# Patient Record
Sex: FEMALE | Race: WHITE | NOT HISPANIC OR LATINO
[De-identification: names, ages, dates, MRNs, and addresses within clinical notes are randomized per-mention and may not be internally consistent; named-entity substitution may affect disease eponyms.]

---

## 2017-06-06 ENCOUNTER — APPOINTMENT (OUTPATIENT)
Dept: CT IMAGING | Facility: IMAGING CENTER | Age: 82
End: 2017-06-06

## 2017-06-06 ENCOUNTER — OUTPATIENT (OUTPATIENT)
Dept: OUTPATIENT SERVICES | Facility: HOSPITAL | Age: 82
LOS: 1 days | End: 2017-06-06
Payer: COMMERCIAL

## 2017-06-06 DIAGNOSIS — M48.06 SPINAL STENOSIS, LUMBAR REGION: Chronic | ICD-10-CM

## 2017-06-06 DIAGNOSIS — Z86.718 PERSONAL HISTORY OF OTHER VENOUS THROMBOSIS AND EMBOLISM: ICD-10-CM

## 2017-06-06 DIAGNOSIS — R10.30 LOWER ABDOMINAL PAIN, UNSPECIFIED: ICD-10-CM

## 2017-06-06 PROCEDURE — 74177 CT ABD & PELVIS W/CONTRAST: CPT

## 2017-06-06 PROCEDURE — 71260 CT THORAX DX C+: CPT

## 2018-03-09 ENCOUNTER — RESULT REVIEW (OUTPATIENT)
Age: 83
End: 2018-03-09

## 2018-04-19 ENCOUNTER — LABORATORY RESULT (OUTPATIENT)
Age: 83
End: 2018-04-19

## 2018-04-19 ENCOUNTER — APPOINTMENT (OUTPATIENT)
Dept: UROLOGY | Facility: CLINIC | Age: 83
End: 2018-04-19
Payer: MEDICARE

## 2018-04-19 VITALS — SYSTOLIC BLOOD PRESSURE: 152 MMHG | RESPIRATION RATE: 16 BRPM | HEART RATE: 78 BPM | DIASTOLIC BLOOD PRESSURE: 72 MMHG

## 2018-04-19 DIAGNOSIS — N28.1 CYST OF KIDNEY, ACQUIRED: ICD-10-CM

## 2018-04-19 DIAGNOSIS — R31.29 OTHER MICROSCOPIC HEMATURIA: ICD-10-CM

## 2018-04-19 PROCEDURE — 99203 OFFICE O/P NEW LOW 30 MIN: CPT | Mod: 25

## 2018-04-19 PROCEDURE — 51701 INSERT BLADDER CATHETER: CPT

## 2018-04-23 LAB
APPEARANCE: CLEAR
BACTERIA UR CULT: NORMAL
BACTERIA: NEGATIVE
BILIRUBIN URINE: NEGATIVE
BLOOD URINE: NEGATIVE
COLOR: YELLOW
GLUCOSE QUALITATIVE U: 100 MG/DL
KETONES URINE: NEGATIVE
LEUKOCYTE ESTERASE URINE: NEGATIVE
MICROSCOPIC-UA: NORMAL
NITRITE URINE: NEGATIVE
PH URINE: 5.5
PROTEIN URINE: NEGATIVE MG/DL
RED BLOOD CELLS URINE: 2 /HPF
SPECIFIC GRAVITY URINE: 1.03
SQUAMOUS EPITHELIAL CELLS: 1 /HPF
UROBILINOGEN URINE: NEGATIVE MG/DL
WHITE BLOOD CELLS URINE: 1 /HPF

## 2018-04-24 ENCOUNTER — RESULT REVIEW (OUTPATIENT)
Age: 83
End: 2018-04-24

## 2018-06-25 ENCOUNTER — APPOINTMENT (OUTPATIENT)
Dept: ORTHOPEDIC SURGERY | Facility: CLINIC | Age: 83
End: 2018-06-25
Payer: MEDICARE

## 2018-06-25 VITALS
HEIGHT: 58 IN | WEIGHT: 170 LBS | SYSTOLIC BLOOD PRESSURE: 166 MMHG | HEART RATE: 80 BPM | BODY MASS INDEX: 35.68 KG/M2 | DIASTOLIC BLOOD PRESSURE: 70 MMHG

## 2018-06-25 PROCEDURE — 99204 OFFICE O/P NEW MOD 45 MIN: CPT | Mod: 25

## 2018-06-25 PROCEDURE — 20610 DRAIN/INJ JOINT/BURSA W/O US: CPT | Mod: 50

## 2019-08-22 ENCOUNTER — APPOINTMENT (OUTPATIENT)
Dept: ORTHOPEDIC SURGERY | Facility: CLINIC | Age: 84
End: 2019-08-22
Payer: MEDICARE

## 2019-08-22 VITALS — SYSTOLIC BLOOD PRESSURE: 167 MMHG | HEART RATE: 71 BPM | DIASTOLIC BLOOD PRESSURE: 74 MMHG

## 2019-08-22 PROCEDURE — 99213 OFFICE O/P EST LOW 20 MIN: CPT | Mod: 25

## 2019-08-22 PROCEDURE — 20610 DRAIN/INJ JOINT/BURSA W/O US: CPT | Mod: RT

## 2019-08-22 NOTE — PHYSICAL EXAM
[LE] : 5/5 motor strength in bilateral lower extremities [Ankle] : ankle 2+ and symmetric bilaterally [Knee] : patellar 2+ and symmetric bilaterally [Plant] : plantar 2+ and symmetric bilaterally [DP] : dorsalis pedis 2+ and symmetric bilaterally [Normal] : Alert and in no acute distress [PT] : posterior tibial 2+ and symmetric bilaterally [Poor Appearance] : well-appearing [Acute Distress] : not in acute distress [Obese] : not obese [de-identified] : The patient has no respiratory distress. Mood and affect are normal. The patient is alert and oriented to person, place and time.\par The patient reports no pain with motion of the hips.  There is no tenderness of either hip.  Examination of the knees demonstrates medial tenderness bilaterally.  There is no instability of collateral or cruciate ligaments.  Range of motion 10 to 100 degrees of both knees.  The calves are soft and nontender.  The skin is intact.  There is no lymphedema.

## 2019-08-22 NOTE — DISCUSSION/SUMMARY
[de-identified] : The patient has osteoarthritis of both knees. I have discussed the pathology, natural history and treatment options with the patient and her daughter. She has opted to receive steroid injections again.\par Informed consent was obtained. Following a sterile prep the right knee was aspirated but no fluid was returned. 1 cc of Depo-Medrol and 4 cc of 1% Xylocaine were injected in the right knee without complication. Sterile dressing was applied. Instructions were given.\par Informed consent was obtained. Following a sterile prep the left knee was aspirated but no fluid was returned. 1 cc of Depo-Medrol and 4 cc of 1% Xylocaine was injected in the left knee without complication. Sterile dressing was applied. Instructions were given.\par She will return as needed.

## 2019-08-22 NOTE — HISTORY OF PRESENT ILLNESS
[de-identified] : The patient presents for reevaluation of bilateral knee arthritis. She was treated with cortisone injections in June 2018 which provided pain relief for over one year. Over the last two months pain in both knees has worsened. She has difficulty going up and down steps. She taking Tylenol or Advil on an as needed basis. She is interested in cortisone injections today.

## 2020-10-22 ENCOUNTER — APPOINTMENT (OUTPATIENT)
Dept: ORTHOPEDIC SURGERY | Facility: CLINIC | Age: 85
End: 2020-10-22
Payer: MEDICARE

## 2020-10-22 VITALS
DIASTOLIC BLOOD PRESSURE: 68 MMHG | BODY MASS INDEX: 35.68 KG/M2 | SYSTOLIC BLOOD PRESSURE: 146 MMHG | HEART RATE: 77 BPM | TEMPERATURE: 97.5 F | WEIGHT: 170 LBS | HEIGHT: 58 IN

## 2020-10-22 PROCEDURE — 99213 OFFICE O/P EST LOW 20 MIN: CPT | Mod: 25

## 2020-10-22 PROCEDURE — 20610 DRAIN/INJ JOINT/BURSA W/O US: CPT | Mod: RT

## 2020-10-22 PROCEDURE — 99072 ADDL SUPL MATRL&STAF TM PHE: CPT

## 2020-10-22 RX ORDER — FUROSEMIDE 40 MG/1
40 TABLET ORAL
Refills: 0 | Status: ACTIVE | COMMUNITY

## 2020-10-22 RX ORDER — LOSARTAN POTASSIUM 100 MG/1
TABLET, FILM COATED ORAL
Refills: 0 | Status: ACTIVE | COMMUNITY

## 2020-10-22 RX ORDER — OMEPRAZOLE 20 MG/1
20 TABLET, DELAYED RELEASE ORAL
Refills: 0 | Status: ACTIVE | COMMUNITY

## 2020-10-22 RX ORDER — DILTIAZEM HCL 120 MG
CAPSULE, EXT RELEASE 24 HR ORAL
Refills: 0 | Status: ACTIVE | COMMUNITY

## 2020-10-22 NOTE — PHYSICAL EXAM
[LE] : 5/5 motor strength in bilateral lower extremities [Knee] : patellar 2+ and symmetric bilaterally [Ankle] : ankle 2+ and symmetric bilaterally [Plant] : plantar 2+ and symmetric bilaterally [DP] : dorsalis pedis 2+ and symmetric bilaterally [PT] : posterior tibial 2+ and symmetric bilaterally [Normal] : Alert and in no acute distress [Poor Appearance] : well-appearing [Acute Distress] : not in acute distress [Obese] : not obese [de-identified] : The patient has no respiratory distress. Mood and affect are normal. The patient is alert and oriented to person, place and time.\par The patient reports no pain with motion of the hips.  There is no tenderness of either hip.  Examination of the knees demonstrates medial tenderness bilaterally.  There is no instability of collateral or cruciate ligaments.  Range of motion 10 to 100 degrees of both knees.  The calves are soft and nontender.  The skin is intact.  There is no lymphedema.

## 2020-10-22 NOTE — HISTORY OF PRESENT ILLNESS
[de-identified] : 86 year old female presents for follow up of bilateral knee arthritis. She was treated with cortisone injections to each knee last year which helped to reduce pain for several months. The pain has worsened recently. She has difficulty navigating steps. She is concerned her knees may give out when she gets out of bed in the morning. She takes Tylenol, Advil or Aleve as needed. She would like to continue with conservative treatment and is interested in having injections again today. \par  \par

## 2021-05-04 NOTE — DISCUSSION/SUMMARY
Pt to 400 Karen Varela RN  05/04/21 0030 [de-identified] : The patient has osteoarthritis of both knees. I have discussed the pathology, natural history and treatment options with the patient and her daughter.  She had longstanding relief from prior injections.  She has opted to receive steroid injections again.\par Informed consent was obtained. Following a sterile prep the right knee was aspirated but no fluid was returned. 1 cc of Depo-Medrol and 4 cc of 1% Xylocaine were injected in the right knee without complication. Sterile dressing was applied. Instructions were given.\par Informed consent was obtained. Following a sterile prep the left knee was aspirated but no fluid was returned. 1 cc of Depo-Medrol and 4 cc of 1% Xylocaine was injected in the left knee without complication. Sterile dressing was applied. Instructions were given.\par She will return as needed.

## 2021-08-05 ENCOUNTER — APPOINTMENT (OUTPATIENT)
Dept: ORTHOPEDIC SURGERY | Facility: CLINIC | Age: 86
End: 2021-08-05
Payer: MEDICARE

## 2021-08-05 VITALS
HEIGHT: 58 IN | HEART RATE: 59 BPM | SYSTOLIC BLOOD PRESSURE: 130 MMHG | BODY MASS INDEX: 35.68 KG/M2 | WEIGHT: 170 LBS | DIASTOLIC BLOOD PRESSURE: 79 MMHG

## 2021-08-05 PROCEDURE — 20610 DRAIN/INJ JOINT/BURSA W/O US: CPT | Mod: LT

## 2021-08-05 PROCEDURE — 99213 OFFICE O/P EST LOW 20 MIN: CPT | Mod: 25

## 2021-08-05 NOTE — HISTORY OF PRESENT ILLNESS
[de-identified] : 87 year old female with bilateral knee arthritis brought in by her daughter and presents with worsening bilateral knee pain x months. She was treated with cortisone injections in October 2020 which provided 6 months of pain relief. She has pain when climbing steps and walking. She is able to walk one block with her walker. Her knees crack when she climbs steps. She takes Advil sparingly because it upset her stomach. She has tried HA injections in the past which did not help. She is interested in having cortisone injections for both knees today.

## 2021-08-05 NOTE — DISCUSSION/SUMMARY
[de-identified] : The patient has symptomatic osteoarthritis of both knees.  Treatment options were discussed in detail.  She would like to try steroid injections.\par Informed consent was obtained. Site and procedure were confirmed with the patient. Following a sterile prep the right knee was aspirated but no fluid was returned. 1 cc of Depo-Medrol and 4 cc of 1% Xylocaine were injected in the right knee without complication. Sterile dressing was applied. Instructions were given.\par Informed consent was obtained. Site and procedure were confirmed with the patient. Following a sterile prep the left knee was aspirated but no fluid was returned. 1 cc of Depo-Medrol and 4 cc of 1% Xylocaine was injected in the left knee without complication. Sterile dressing was applied. Instructions were given.\par She will return as needed.

## 2021-08-05 NOTE — PHYSICAL EXAM
[LE] : 5/5 motor strength in bilateral lower extremities [Knee] : patellar 2+ and symmetric bilaterally [Ankle] : ankle 2+ and symmetric bilaterally [Plant] : plantar 2+ and symmetric bilaterally [DP] : dorsalis pedis 2+ and symmetric bilaterally [PT] : posterior tibial 2+ and symmetric bilaterally [Normal] : Alert and in no acute distress [Poor Appearance] : well-appearing [Acute Distress] : not in acute distress [Obese] : not obese [de-identified] : The patient has no respiratory distress. Mood and affect are normal. The patient is alert and oriented to person, place and time.\par The patient reports no pain with motion of the hips.  There is no tenderness of either hip.  Examination of the knees demonstrates medial tenderness bilaterally.  There is no instability of collateral or cruciate ligaments.  Range of motion 10 to 100 degrees of both knees.  The calves are soft and nontender.  The skin is intact.  There is no lymphedema.

## 2022-08-15 ENCOUNTER — APPOINTMENT (OUTPATIENT)
Dept: ORTHOPEDIC SURGERY | Facility: CLINIC | Age: 87
End: 2022-08-15
Payer: MEDICARE

## 2022-08-15 VITALS — BODY MASS INDEX: 35.68 KG/M2 | HEIGHT: 58 IN | WEIGHT: 170 LBS

## 2022-08-15 DIAGNOSIS — M17.0 BILATERAL PRIMARY OSTEOARTHRITIS OF KNEE: ICD-10-CM

## 2022-08-15 PROCEDURE — 73564 X-RAY EXAM KNEE 4 OR MORE: CPT | Mod: RT

## 2022-08-15 PROCEDURE — 99214 OFFICE O/P EST MOD 30 MIN: CPT | Mod: 25

## 2022-08-15 PROCEDURE — 20610 DRAIN/INJ JOINT/BURSA W/O US: CPT | Mod: RT

## 2022-08-15 NOTE — HISTORY OF PRESENT ILLNESS
[de-identified] : 88 year old female presents for reevaluation of bilateral knee arthritis. She states last injections did not provide any relief. Tylenol Arthritis strength with some relief. She has used BioFreeze and Icy Hot with mild relief. Pain is constant and worse with walking and climbing stairs.

## 2022-08-15 NOTE — DISCUSSION/SUMMARY
[de-identified] : The patient has osteoarthritis of both knees.  I have discussed the pathology, natural history and treatment options with her and her daughter.  She has opted to receive steroid injections today.\par Informed consent was obtained. Site and procedure were confirmed with the patient. Following a sterile prep the right knee was aspirated but no fluid was returned. 1 cc of Depo-Medrol and 4 cc of 1% Xylocaine were injected in the right knee without complication. Sterile dressing was applied. Instructions were given.\par Informed consent was obtained. Site and procedure were confirmed with the patient. Following a sterile prep the left knee was aspirated but no fluid was returned. 1 cc of Depo-Medrol and 4 cc of 1% Xylocaine was injected in the left knee without complication. Sterile dressing was applied. Instructions were given.\par She will return as needed.

## 2022-08-15 NOTE — PHYSICAL EXAM
[LE] : 5/5 motor strength in bilateral lower extremities [Knee] : patellar 2+ and symmetric bilaterally [Ankle] : ankle 2+ and symmetric bilaterally [Plant] : plantar 2+ and symmetric bilaterally [DP] : dorsalis pedis 2+ and symmetric bilaterally [PT] : posterior tibial 2+ and symmetric bilaterally [Normal] : Alert and in no acute distress [Poor Appearance] : well-appearing [Acute Distress] : not in acute distress [Obese] : not obese [de-identified] : The patient has no respiratory distress. Mood and affect are normal. The patient is alert and oriented to person, place and time.\par The patient reports no pain with motion of the hips.  There is no tenderness of either hip.  Examination of the knees demonstrates medial tenderness bilaterally.  There is no instability of collateral or cruciate ligaments.  Range of motion 10 to 100 degrees of both knees.  The calves are soft and nontender.  The skin is intact.  There is no lymphedema. [de-identified] : AP, lateral, tunnel and sunrise x-rays of both knees taken today demonstrate severe tricompartmental arthritis bilaterally.  There are no fractures or dislocations.